# Patient Record
Sex: MALE | Race: WHITE | NOT HISPANIC OR LATINO
[De-identification: names, ages, dates, MRNs, and addresses within clinical notes are randomized per-mention and may not be internally consistent; named-entity substitution may affect disease eponyms.]

---

## 2021-04-13 ENCOUNTER — APPOINTMENT (OUTPATIENT)
Dept: PULMONOLOGY | Facility: CLINIC | Age: 46
End: 2021-04-13
Payer: MEDICAID

## 2021-04-13 VITALS — BODY MASS INDEX: 39.18 KG/M2 | WEIGHT: 315 LBS | SYSTOLIC BLOOD PRESSURE: 118 MMHG | DIASTOLIC BLOOD PRESSURE: 80 MMHG

## 2021-04-13 DIAGNOSIS — Z99.89 OBSTRUCTIVE SLEEP APNEA (ADULT) (PEDIATRIC): ICD-10-CM

## 2021-04-13 DIAGNOSIS — Z86.39 PERSONAL HISTORY OF OTHER ENDOCRINE, NUTRITIONAL AND METABOLIC DISEASE: ICD-10-CM

## 2021-04-13 DIAGNOSIS — I48.0 PAROXYSMAL ATRIAL FIBRILLATION: ICD-10-CM

## 2021-04-13 DIAGNOSIS — Z86.79 PERSONAL HISTORY OF OTHER DISEASES OF THE CIRCULATORY SYSTEM: ICD-10-CM

## 2021-04-13 DIAGNOSIS — G47.33 OBSTRUCTIVE SLEEP APNEA (ADULT) (PEDIATRIC): ICD-10-CM

## 2021-04-13 PROCEDURE — 99072 ADDL SUPL MATRL&STAF TM PHE: CPT

## 2021-04-13 PROCEDURE — 99204 OFFICE O/P NEW MOD 45 MIN: CPT

## 2021-04-18 DIAGNOSIS — Z01.818 ENCOUNTER FOR OTHER PREPROCEDURAL EXAMINATION: ICD-10-CM

## 2021-04-19 ENCOUNTER — APPOINTMENT (OUTPATIENT)
Dept: DISASTER EMERGENCY | Facility: CLINIC | Age: 46
End: 2021-04-19

## 2021-04-20 LAB — SARS-COV-2 N GENE NPH QL NAA+PROBE: NOT DETECTED

## 2021-04-21 ENCOUNTER — OUTPATIENT (OUTPATIENT)
Dept: OUTPATIENT SERVICES | Facility: HOSPITAL | Age: 46
LOS: 1 days | End: 2021-04-21
Payer: COMMERCIAL

## 2021-04-21 DIAGNOSIS — G47.33 OBSTRUCTIVE SLEEP APNEA (ADULT) (PEDIATRIC): ICD-10-CM

## 2021-04-21 PROCEDURE — 95811 POLYSOM 6/>YRS CPAP 4/> PARM: CPT

## 2021-04-21 PROCEDURE — 95811 POLYSOM 6/>YRS CPAP 4/> PARM: CPT | Mod: 26

## 2021-06-27 ENCOUNTER — TRANSCRIPTION ENCOUNTER (OUTPATIENT)
Age: 46
End: 2021-06-27

## 2021-07-08 ENCOUNTER — NON-APPOINTMENT (OUTPATIENT)
Age: 46
End: 2021-07-08

## 2021-07-08 ENCOUNTER — APPOINTMENT (OUTPATIENT)
Dept: ELECTROPHYSIOLOGY | Facility: CLINIC | Age: 46
End: 2021-07-08
Payer: MEDICAID

## 2021-07-08 VITALS
BODY MASS INDEX: 36.45 KG/M2 | SYSTOLIC BLOOD PRESSURE: 122 MMHG | TEMPERATURE: 97.2 F | OXYGEN SATURATION: 96 % | DIASTOLIC BLOOD PRESSURE: 74 MMHG | WEIGHT: 315 LBS | HEIGHT: 78 IN | HEART RATE: 75 BPM

## 2021-07-08 VITALS — DIASTOLIC BLOOD PRESSURE: 76 MMHG | SYSTOLIC BLOOD PRESSURE: 120 MMHG

## 2021-07-08 DIAGNOSIS — Z78.9 OTHER SPECIFIED HEALTH STATUS: ICD-10-CM

## 2021-07-08 DIAGNOSIS — Z60.2 PROBLEMS RELATED TO LIVING ALONE: ICD-10-CM

## 2021-07-08 DIAGNOSIS — Z63.5 DISRUPTION OF FAMILY BY SEPARATION AND DIVORCE: ICD-10-CM

## 2021-07-08 PROCEDURE — 99204 OFFICE O/P NEW MOD 45 MIN: CPT | Mod: 25

## 2021-07-08 PROCEDURE — 93000 ELECTROCARDIOGRAM COMPLETE: CPT

## 2021-07-08 RX ORDER — METFORMIN HYDROCHLORIDE 1000 MG/1
1000 TABLET, COATED ORAL DAILY
Refills: 0 | Status: ACTIVE | COMMUNITY

## 2021-07-08 RX ORDER — APIXABAN 5 MG/1
5 TABLET, FILM COATED ORAL
Qty: 60 | Refills: 3 | Status: ACTIVE | COMMUNITY

## 2021-07-08 SDOH — SOCIAL STABILITY - SOCIAL INSECURITY: DISRUPTION OF FAMILY BY SEPARATION AND DIVORCE: Z63.5

## 2021-07-08 SDOH — SOCIAL STABILITY - SOCIAL INSECURITY: PROBLEMS RELATED TO LIVING ALONE: Z60.2

## 2022-05-16 ENCOUNTER — TRANSCRIPTION ENCOUNTER (OUTPATIENT)
Age: 47
End: 2022-05-16

## 2022-05-16 ENCOUNTER — OUTPATIENT (OUTPATIENT)
Dept: OUTPATIENT SERVICES | Facility: HOSPITAL | Age: 47
LOS: 1 days | End: 2022-05-16
Payer: COMMERCIAL

## 2022-05-16 ENCOUNTER — OUTPATIENT (OUTPATIENT)
Dept: OUTPATIENT SERVICES | Facility: HOSPITAL | Age: 47
LOS: 1 days | End: 2022-05-16

## 2022-05-16 VITALS
RESPIRATION RATE: 18 BRPM | OXYGEN SATURATION: 97 % | HEART RATE: 69 BPM | SYSTOLIC BLOOD PRESSURE: 102 MMHG | TEMPERATURE: 97 F | WEIGHT: 315 LBS | DIASTOLIC BLOOD PRESSURE: 65 MMHG | HEIGHT: 78 IN

## 2022-05-16 DIAGNOSIS — U07.1 COVID-19: ICD-10-CM

## 2022-05-16 DIAGNOSIS — Z98.890 OTHER SPECIFIED POSTPROCEDURAL STATES: Chronic | ICD-10-CM

## 2022-05-16 DIAGNOSIS — I48.0 PAROXYSMAL ATRIAL FIBRILLATION: ICD-10-CM

## 2022-05-16 DIAGNOSIS — I10 ESSENTIAL (PRIMARY) HYPERTENSION: ICD-10-CM

## 2022-05-16 DIAGNOSIS — E11.9 TYPE 2 DIABETES MELLITUS WITHOUT COMPLICATIONS: ICD-10-CM

## 2022-05-16 DIAGNOSIS — Z01.818 ENCOUNTER FOR OTHER PREPROCEDURAL EXAMINATION: ICD-10-CM

## 2022-05-16 DIAGNOSIS — J34.2 DEVIATED NASAL SEPTUM: Chronic | ICD-10-CM

## 2022-05-16 LAB
ANION GAP SERPL CALC-SCNC: 14 MMOL/L — SIGNIFICANT CHANGE UP (ref 5–17)
APTT BLD: 33 SEC — SIGNIFICANT CHANGE UP (ref 27.5–35.5)
BASOPHILS # BLD AUTO: 0.03 K/UL — SIGNIFICANT CHANGE UP (ref 0–0.2)
BASOPHILS NFR BLD AUTO: 0.3 % — SIGNIFICANT CHANGE UP (ref 0–2)
BLD GP AB SCN SERPL QL: SIGNIFICANT CHANGE UP
BUN SERPL-MCNC: 13.2 MG/DL — SIGNIFICANT CHANGE UP (ref 8–20)
CALCIUM SERPL-MCNC: 8.8 MG/DL — SIGNIFICANT CHANGE UP (ref 8.6–10.2)
CHLORIDE SERPL-SCNC: 101 MMOL/L — SIGNIFICANT CHANGE UP (ref 98–107)
CO2 SERPL-SCNC: 22 MMOL/L — SIGNIFICANT CHANGE UP (ref 22–29)
CREAT SERPL-MCNC: 0.82 MG/DL — SIGNIFICANT CHANGE UP (ref 0.5–1.3)
EGFR: 110 ML/MIN/1.73M2 — SIGNIFICANT CHANGE UP
EOSINOPHIL # BLD AUTO: 0.68 K/UL — HIGH (ref 0–0.5)
EOSINOPHIL NFR BLD AUTO: 7 % — HIGH (ref 0–6)
GLUCOSE SERPL-MCNC: 150 MG/DL — HIGH (ref 70–99)
HCT VFR BLD CALC: 42.8 % — SIGNIFICANT CHANGE UP (ref 39–50)
HGB BLD-MCNC: 15.1 G/DL — SIGNIFICANT CHANGE UP (ref 13–17)
IMM GRANULOCYTES NFR BLD AUTO: 0.6 % — SIGNIFICANT CHANGE UP (ref 0–1.5)
INR BLD: 1.3 RATIO — HIGH (ref 0.88–1.16)
LYMPHOCYTES # BLD AUTO: 1.46 K/UL — SIGNIFICANT CHANGE UP (ref 1–3.3)
LYMPHOCYTES # BLD AUTO: 14.9 % — SIGNIFICANT CHANGE UP (ref 13–44)
MAGNESIUM SERPL-MCNC: 1.7 MG/DL — SIGNIFICANT CHANGE UP (ref 1.6–2.6)
MCHC RBC-ENTMCNC: 30.1 PG — SIGNIFICANT CHANGE UP (ref 27–34)
MCHC RBC-ENTMCNC: 35.3 GM/DL — SIGNIFICANT CHANGE UP (ref 32–36)
MCV RBC AUTO: 85.3 FL — SIGNIFICANT CHANGE UP (ref 80–100)
MONOCYTES # BLD AUTO: 0.81 K/UL — SIGNIFICANT CHANGE UP (ref 0–0.9)
MONOCYTES NFR BLD AUTO: 8.3 % — SIGNIFICANT CHANGE UP (ref 2–14)
NEUTROPHILS # BLD AUTO: 6.74 K/UL — SIGNIFICANT CHANGE UP (ref 1.8–7.4)
NEUTROPHILS NFR BLD AUTO: 68.9 % — SIGNIFICANT CHANGE UP (ref 43–77)
PLATELET # BLD AUTO: 247 K/UL — SIGNIFICANT CHANGE UP (ref 150–400)
POTASSIUM SERPL-MCNC: 4.2 MMOL/L — SIGNIFICANT CHANGE UP (ref 3.5–5.3)
POTASSIUM SERPL-SCNC: 4.2 MMOL/L — SIGNIFICANT CHANGE UP (ref 3.5–5.3)
PROTHROM AB SERPL-ACNC: 15.1 SEC — HIGH (ref 10.5–13.4)
RBC # BLD: 5.02 M/UL — SIGNIFICANT CHANGE UP (ref 4.2–5.8)
RBC # FLD: 12 % — SIGNIFICANT CHANGE UP (ref 10.3–14.5)
SODIUM SERPL-SCNC: 137 MMOL/L — SIGNIFICANT CHANGE UP (ref 135–145)
WBC # BLD: 9.78 K/UL — SIGNIFICANT CHANGE UP (ref 3.8–10.5)
WBC # FLD AUTO: 9.78 K/UL — SIGNIFICANT CHANGE UP (ref 3.8–10.5)

## 2022-05-16 PROCEDURE — 93005 ELECTROCARDIOGRAM TRACING: CPT

## 2022-05-16 PROCEDURE — G0463: CPT

## 2022-05-16 PROCEDURE — 93010 ELECTROCARDIOGRAM REPORT: CPT

## 2022-05-16 NOTE — H&P PST ADULT - CARDIOVASCULAR
not applicable details… Regular rate & rhythm, normal S1, S2; no murmurs, gallops or rubs; no S3, S4

## 2022-05-16 NOTE — H&P PST ADULT - HISTORY OF PRESENT ILLNESS
46y Male with history of _____________, and symptomatic **paroxysmal or persistent** atrial fibrillation. **add additional description as applicable**     Male now presents in anticipation of elective radiofrequency ablation of atrial fibrillation.       Echocardiogram (date):   Stress Test (date):  Cardiac CT or MRI (date):   Cardiac Cath (date):   Cardiac surgery (date):  46y Male with history of htn, DM, obesity  sleep apnea uses CPAP  and symptomatic paroxysmal atrial fibrillation. Pt states he started a few yrs ago ( approx 3 ) and would feel heart racing with dyspnea. Pt treats  with metoprolol and eliquis, Pt states episodes have become more frequent about every week  and would like to have ablation.      Male now presents in anticipation of elective radiofrequency ablation of atrial fibrillation.       Echocardiogram (date): 4/6/21 [facility]  4/6/21  LVEF 50-55 %   Stress Test (date):  Cardiac CT or MRI (date): 2022 [facility] no CAD  4/28/21  calcium score 95   Cardiac Cath (date): 2021 [facility] no cad   Cardiac surgery (date):

## 2022-05-16 NOTE — H&P PST ADULT - NSICDXFAMILYHX_GEN_ALL_CORE_FT
FAMILY HISTORY:  Mother  Still living? No  Family history of atrial fibrillation, Age at diagnosis: Age Unknown     FAMILY HISTORY:  Father  Still living? No  FH: GI cancer, Age at diagnosis: Age Unknown    Mother  Still living? No  Family history of atrial fibrillation, Age at diagnosis: Age Unknown

## 2022-05-16 NOTE — H&P PST ADULT - NSICDXPASTMEDICALHX_GEN_ALL_CORE_FT
PAST MEDICAL HISTORY:  HTN (hypertension)     Obesity     Paroxysmal atrial fibrillation     Sleep apnea      PAST MEDICAL HISTORY:  Diabetes mellitus     Fatty liver     HTN (hypertension)     Obesity     Paroxysmal atrial fibrillation     Sleep apnea

## 2022-05-16 NOTE — H&P PST ADULT - ASSESSMENT
Plan:   Labs pending.   Pre-procedure instructions provided (verbal & written) as follows:  Ablation */*/2021   - Last dose Eliquis/Pradaxa/Xarelto/Savaysa */*/2021 PM (NO a/c day of ablation).   OR - Continue warfarin without interruption.   - NPO after midnight prior except meds w/ sips of water.    - Hold the following medications the morning of the procedure: ***Check MD note for possible instructions re: holding antiarrhythmic medications*** 46y Male with history of htn, DM, obesity  sleep apnea uses CPAP  and symptomatic paroxysmal atrial fibrillation. Pt states he started a few yrs ago ( approx 3 ) and would feel heart racing with dyspnea. Pt treats  with metoprolol and eliquis, Pt states episodes have become more frequent about every week  and would like to have ablation.      Male now presents in anticipation of elective radiofrequency ablation of atrial fibrillation.       Echocardiogram (date): 21 [facility]  21  LVEF 50-55 %   Stress Test (date):  Cardiac CT or MRI (date):  [facility] no CAD  21  calcium score 95   Cardiac Cath (date):  [facility] no cad   Cardiac surgery (date):   Plan: afib ablation   Labs pending.   Pre-procedure instructions provided (verbal & written) as follows:  Ablation 22    - Last dose Eliquis 22 PM (NO a/c day of ablation).      - NPO after midnight prior except meds w/ sips of water. metoporol   hold metformin am of procedure   OPIOID RISK TOOL    FALLON EACH BOX THAT APPLIES AND ADD TOTALS AT THE END    FAMILY HISTORY OF SUBSTANCE ABUSE                 FEMALE         MALE                                                Alcohol                             [  ]1 pt          [  ]3pts                                               Illegal Durgs                     [  ]2 pts        [  ]3pts                                               Rx Drugs                           [  ]4 pts        [  ]4 pts    PERSONAL HISTORY OF SUBSTANCE ABUSE                                                                                          Alcohol                             [  ]3 pts       [  ]3 pts                                               Illegal Durgs                     [  ]4 pts        [  ]4 pts                                               Rx Drugs                           [  ]5 pts        [  ]5 pts    AGE BETWEEN 16-45 YEARS                                      [  ]1 pt         [  ]1 pt    HISTORY OF PREADOLESCENT   SEXUAL ABUSE                                                             [  ]3 pts        [  ]0pts    PSYCHOLOGICAL DISEASE                     ADD, OCD, Bipolar, Schizophrenia        [  ]2 pts         [  ]2 pts                      Depression                                               [  ]1 pt           [  ]1 pt           SCORING TOTAL   (add numbers and type here)              (*0**)                                     A score of 3 or lower indicated LOW risk for future opiod abuse  A score of 4 to 7 indicated moderate risk for future opiod abuse  A score of 8 or higher indicates a high risk for opiod abuse  CAPRINI VTE 2.0 SCORE [CLOT updated 2019]    AGE RELATED RISK FACTORS                                                       MOBILITY RELATED FACTORS  [X ] Age 41-60 years                                            (1 Point)                    [ ] Bed rest                                                        (1 Point)  [ ] Age: 61-74 years                                           (2 Points)                  [ ] Plaster cast                                                   (2 Points)  [ ] Age= 75 years                                              (3 Points)                    [ ] Bed bound for more than 72 hours                 (2 Points)    DISEASE RELATED RISK FACTORS                                               GENDER SPECIFIC FACTORS  [ ] Edema in the lower extremities                       (1 Point)              [ ] Pregnancy                                                     (1 Point)  [ ] Varicose veins                                               (1 Point)                     [ ] Post-partum < 6 weeks                                   (1 Point)             [ X] BMI > 25 Kg/m2                                            (1 Point)                     [ ] Hormonal therapy  or oral contraception          (1 Point)                 [ ] Sepsis (in the previous month)                        (1 Point)               [ ] History of pregnancy complications                 (1 point)  [ ] Pneumonia or serious lung disease                                               [ ] Unexplained or recurrent                     (1 Point)           (in the previous month)                               (1 Point)  [ ] Abnormal pulmonary function test                     (1 Point)                 SURGERY RELATED RISK FACTORS  [ ] Acute myocardial infarction                              (1 Point)               [ ]  Section                                             (1 Point)  [ ] Congestive heart failure (in the previous month)  (1 Point)      [ ] Minor surgery                                                  (1 Point)   [ ] Inflammatory bowel disease                             (1 Point)               [ ] Arthroscopic surgery                                        (2 Points)  [ ] Central venous access                                      (2 Points)                [x ] General surgery lasting more than 45 minutes (2 points)  [ ] Malignancy- Present or previous                   (2 Points)                [ ] Elective arthroplasty                                         (5 points)    [ ] Stroke (in the previous month)                          (5 Points)                                                                                                                                                           HEMATOLOGY RELATED FACTORS                                                 TRAUMA RELATED RISK FACTORS  [ ] Prior episodes of VTE                                     (3 Points)                [ ] Fracture of the hip, pelvis, or leg                       (5 Points)  [ ] Positive family history for VTE                         (3 Points)             [ ] Acute spinal cord injury (in the previous month)  (5 Points)  [ ] Prothrombin 32524 A                                     (3 Points)               [ ] Paralysis  (less than 1 month)                             (5 Points)  [ ] Factor V Leiden                                             (3 Points)                  [ ] Multiple Trauma within 1 month                        (5 Points)  [ ] Lupus anticoagulants                                     (3 Points)                                                           [ ] Anticardiolipin antibodies                               (3 Points)                                                       [ ] High homocysteine in the blood                      (3 Points)                                             [ ] Other congenital or acquired thrombophilia      (3 Points)                                                [ ] Heparin induced thrombocytopenia                  (3 Points)                                     Total Score [     4     ]

## 2022-05-23 ENCOUNTER — INPATIENT (INPATIENT)
Facility: HOSPITAL | Age: 47
LOS: 0 days | Discharge: HOME | DRG: 274 | End: 2022-05-24
Attending: STUDENT IN AN ORGANIZED HEALTH CARE EDUCATION/TRAINING PROGRAM | Admitting: STUDENT IN AN ORGANIZED HEALTH CARE EDUCATION/TRAINING PROGRAM
Payer: COMMERCIAL

## 2022-05-23 ENCOUNTER — TRANSCRIPTION ENCOUNTER (OUTPATIENT)
Age: 47
End: 2022-05-23

## 2022-05-23 VITALS
SYSTOLIC BLOOD PRESSURE: 159 MMHG | OXYGEN SATURATION: 97 % | DIASTOLIC BLOOD PRESSURE: 86 MMHG | RESPIRATION RATE: 20 BRPM | TEMPERATURE: 98 F | HEART RATE: 60 BPM

## 2022-05-23 DIAGNOSIS — Z98.890 OTHER SPECIFIED POSTPROCEDURAL STATES: Chronic | ICD-10-CM

## 2022-05-23 DIAGNOSIS — J34.2 DEVIATED NASAL SEPTUM: Chronic | ICD-10-CM

## 2022-05-23 DIAGNOSIS — I48.0 PAROXYSMAL ATRIAL FIBRILLATION: ICD-10-CM

## 2022-05-23 LAB
ABO RH CONFIRMATION: SIGNIFICANT CHANGE UP
GLUCOSE BLDC GLUCOMTR-MCNC: 179 MG/DL — HIGH (ref 70–99)
GLUCOSE BLDC GLUCOMTR-MCNC: 189 MG/DL — HIGH (ref 70–99)
GLUCOSE BLDC GLUCOMTR-MCNC: 198 MG/DL — HIGH (ref 70–99)
GLUCOSE BLDC GLUCOMTR-MCNC: 235 MG/DL — HIGH (ref 70–99)
SARS-COV-2 N GENE NPH QL NAA+PROBE: NOT DETECTED

## 2022-05-23 PROCEDURE — 93623 PRGRMD STIMJ&PACG IV RX NFS: CPT | Mod: 26

## 2022-05-23 PROCEDURE — 93656 COMPRE EP EVAL ABLTJ ATR FIB: CPT

## 2022-05-23 PROCEDURE — 93657 TX L/R ATRIAL FIB ADDL: CPT

## 2022-05-23 RX ORDER — LOSARTAN POTASSIUM 100 MG/1
50 TABLET, FILM COATED ORAL DAILY
Refills: 0 | Status: DISCONTINUED | OUTPATIENT
Start: 2022-05-23 | End: 2022-05-24

## 2022-05-23 RX ORDER — SODIUM CHLORIDE 9 MG/ML
1000 INJECTION, SOLUTION INTRAVENOUS
Refills: 0 | Status: DISCONTINUED | OUTPATIENT
Start: 2022-05-23 | End: 2022-05-24

## 2022-05-23 RX ORDER — METFORMIN HYDROCHLORIDE 850 MG/1
1 TABLET ORAL
Qty: 0 | Refills: 0 | DISCHARGE

## 2022-05-23 RX ORDER — ATORVASTATIN CALCIUM 80 MG/1
20 TABLET, FILM COATED ORAL AT BEDTIME
Refills: 0 | Status: DISCONTINUED | OUTPATIENT
Start: 2022-05-23 | End: 2022-05-24

## 2022-05-23 RX ORDER — ALPRAZOLAM 0.25 MG
0.25 TABLET ORAL EVERY 6 HOURS
Refills: 0 | Status: DISCONTINUED | OUTPATIENT
Start: 2022-05-23 | End: 2022-05-24

## 2022-05-23 RX ORDER — ATORVASTATIN CALCIUM 80 MG/1
1 TABLET, FILM COATED ORAL
Qty: 0 | Refills: 0 | DISCHARGE

## 2022-05-23 RX ORDER — DEXTROSE 50 % IN WATER 50 %
12.5 SYRINGE (ML) INTRAVENOUS ONCE
Refills: 0 | Status: DISCONTINUED | OUTPATIENT
Start: 2022-05-23 | End: 2022-05-24

## 2022-05-23 RX ORDER — APIXABAN 2.5 MG/1
1 TABLET, FILM COATED ORAL
Qty: 0 | Refills: 0 | DISCHARGE

## 2022-05-23 RX ORDER — SEMAGLUTIDE 0.68 MG/ML
0 INJECTION, SOLUTION SUBCUTANEOUS
Qty: 0 | Refills: 0 | DISCHARGE

## 2022-05-23 RX ORDER — DEXTROSE 50 % IN WATER 50 %
15 SYRINGE (ML) INTRAVENOUS ONCE
Refills: 0 | Status: DISCONTINUED | OUTPATIENT
Start: 2022-05-23 | End: 2022-05-24

## 2022-05-23 RX ORDER — DEXTROSE 50 % IN WATER 50 %
25 SYRINGE (ML) INTRAVENOUS ONCE
Refills: 0 | Status: DISCONTINUED | OUTPATIENT
Start: 2022-05-23 | End: 2022-05-24

## 2022-05-23 RX ORDER — PANTOPRAZOLE SODIUM 20 MG/1
40 TABLET, DELAYED RELEASE ORAL EVERY 12 HOURS
Refills: 0 | Status: DISCONTINUED | OUTPATIENT
Start: 2022-05-23 | End: 2022-05-24

## 2022-05-23 RX ORDER — LOSARTAN POTASSIUM 100 MG/1
1 TABLET, FILM COATED ORAL
Qty: 0 | Refills: 0 | DISCHARGE

## 2022-05-23 RX ORDER — METOPROLOL TARTRATE 50 MG
50 TABLET ORAL DAILY
Refills: 0 | Status: DISCONTINUED | OUTPATIENT
Start: 2022-05-23 | End: 2022-05-24

## 2022-05-23 RX ORDER — ACETAMINOPHEN 500 MG
650 TABLET ORAL EVERY 6 HOURS
Refills: 0 | Status: DISCONTINUED | OUTPATIENT
Start: 2022-05-23 | End: 2022-05-24

## 2022-05-23 RX ORDER — APIXABAN 2.5 MG/1
5 TABLET, FILM COATED ORAL
Refills: 0 | Status: DISCONTINUED | OUTPATIENT
Start: 2022-05-23 | End: 2022-05-24

## 2022-05-23 RX ORDER — GLUCAGON INJECTION, SOLUTION 0.5 MG/.1ML
1 INJECTION, SOLUTION SUBCUTANEOUS ONCE
Refills: 0 | Status: DISCONTINUED | OUTPATIENT
Start: 2022-05-23 | End: 2022-05-24

## 2022-05-23 RX ORDER — INSULIN LISPRO 100/ML
VIAL (ML) SUBCUTANEOUS AT BEDTIME
Refills: 0 | Status: DISCONTINUED | OUTPATIENT
Start: 2022-05-23 | End: 2022-05-24

## 2022-05-23 RX ORDER — METOPROLOL TARTRATE 50 MG
1 TABLET ORAL
Qty: 0 | Refills: 0 | DISCHARGE

## 2022-05-23 RX ORDER — SUCRALFATE 1 G
1 TABLET ORAL EVERY 12 HOURS
Refills: 0 | Status: DISCONTINUED | OUTPATIENT
Start: 2022-05-23 | End: 2022-05-24

## 2022-05-23 RX ORDER — OXYCODONE HYDROCHLORIDE 5 MG/1
5 TABLET ORAL EVERY 6 HOURS
Refills: 0 | Status: DISCONTINUED | OUTPATIENT
Start: 2022-05-23 | End: 2022-05-24

## 2022-05-23 RX ORDER — LISINOPRIL 2.5 MG/1
1 TABLET ORAL
Qty: 0 | Refills: 0 | DISCHARGE

## 2022-05-23 RX ORDER — BENZOCAINE AND MENTHOL 5; 1 G/100ML; G/100ML
1 LIQUID ORAL
Refills: 0 | Status: DISCONTINUED | OUTPATIENT
Start: 2022-05-23 | End: 2022-05-24

## 2022-05-23 RX ORDER — INSULIN LISPRO 100/ML
VIAL (ML) SUBCUTANEOUS
Refills: 0 | Status: DISCONTINUED | OUTPATIENT
Start: 2022-05-23 | End: 2022-05-24

## 2022-05-23 RX ADMIN — PANTOPRAZOLE SODIUM 40 MILLIGRAM(S): 20 TABLET, DELAYED RELEASE ORAL at 17:48

## 2022-05-23 RX ADMIN — Medication 2: at 16:08

## 2022-05-23 RX ADMIN — ATORVASTATIN CALCIUM 20 MILLIGRAM(S): 80 TABLET, FILM COATED ORAL at 21:55

## 2022-05-23 RX ADMIN — Medication 50 MILLIGRAM(S): at 21:55

## 2022-05-23 RX ADMIN — Medication 0.25 MILLIGRAM(S): at 21:55

## 2022-05-23 RX ADMIN — Medication 1 GRAM(S): at 17:46

## 2022-05-23 RX ADMIN — APIXABAN 5 MILLIGRAM(S): 2.5 TABLET, FILM COATED ORAL at 16:07

## 2022-05-23 NOTE — PROGRESS NOTE ADULT - SUBJECTIVE AND OBJECTIVE BOX
PROCEDURE(S): Radiofrequency Ablation of Atrial Fibrillation    ELECTROPHYSIOLOGIST(S): Chin Egan MD         COMPLICATIONS:  none        DISPOSITION: observation      CONDITION: stable      Pt doing well s/p elective radiofrequency atrial fibrillation ablation (WACA/PVI, CTI line) access via b/l femoral veins, hemostasis achieved with figure of 8 sutures b/l.  Pt denies any complaint post procedure.     MEDICATIONS  (STANDING):  apixaban 5 milliGRAM(s) Oral <User Schedule>  atorvastatin 20 milliGRAM(s) Oral at bedtime  dextrose 5%. 1000 milliLiter(s) (50 mL/Hr) IV Continuous <Continuous>  dextrose 5%. 1000 milliLiter(s) (100 mL/Hr) IV Continuous <Continuous>  dextrose 50% Injectable 25 Gram(s) IV Push once  dextrose 50% Injectable 12.5 Gram(s) IV Push once  dextrose 50% Injectable 25 Gram(s) IV Push once  glucagon  Injectable 1 milliGRAM(s) IntraMuscular once  insulin lispro (ADMELOG) corrective regimen sliding scale   SubCutaneous three times a day before meals  insulin lispro (ADMELOG) corrective regimen sliding scale   SubCutaneous at bedtime  losartan 50 milliGRAM(s) Oral daily  metoprolol succinate ER 50 milliGRAM(s) Oral daily  pantoprazole    Tablet 40 milliGRAM(s) Oral every 12 hours  sucralfate suspension 1 Gram(s) Oral every 12 hours    MEDICATIONS  (PRN):  acetaminophen     Tablet .. 650 milliGRAM(s) Oral every 6 hours PRN Moderate Pain (4 - 6)  aluminum hydroxide/magnesium hydroxide/simethicone Suspension 30 milliLiter(s) Oral every 4 hours PRN Dyspepsia  benzocaine 15 mG/menthol 3.6 mG Lozenge 1 Lozenge Oral every 2 hours PRN Sore Throat  dextrose Oral Gel 15 Gram(s) Oral once PRN Blood Glucose LESS THAN 70 milliGRAM(s)/deciliter  oxyCODONE    IR 5 milliGRAM(s) Oral every 6 hours PRN Severe Pain (7 - 10)      Allergies    No Known Allergies    VS:  T(C): 36.8 (05-23-22 @ 07:44), Max: 36.8 (05-23-22 @ 07:44)  HR: 60 (05-23-22 @ 07:44) (60 - 60)  BP: 159/86 (05-23-22 @ 07:44) (159/86 - 159/86)  RR: 20 (05-23-22 @ 07:44) (20 - 20)  SpO2: 97% (05-23-22 @ 07:44) (97% - 97%)      Post procedure VS:  HR: 83  BP: 131/71  RR: 18  SpO2: 93%      Exam:  General: NAD, A&O x 3  Card: S1/S2, RRR, no m/g/r  Resp: lungs CTA b/l  Abd: S/NT/ND  Groins: hemostatic sutures in place b/l; sites C/D/I; no bleeding, hematoma, erythema, exudate or edema  Ext: no edema; distal pulses intact    I/O's    Input: 4507    ECG: Pending    Assessment:   46y Male h/o HTN, DM II, HLD, FIFI (nocturnal CPAP), paroxsymal atrial fibrillation (Eliquis), with symptomatic palpitations associated with dyspnea, now status post elective radiofrequency atrial fibrillation ablation (WACA/PVI, CTI line) access via b/l femoral veins, hemostasis achieved with figure of 8 sutures b/l.  Pt denies any complaint post procedure.       Plan:   Bedrest x 4 hours, then OOB with assistance and progress as tolerated.   Groin sutures to be removed by EP service in AM.   Pending groin status: Resume Eliquis 5mg BID @ 1600  Start Protonix 40mg BID x 2 weeks then daily X 6 weeks.     Start Carafate 1gm BID x 2 weeks.   Decrease Metoprolol Succinate to 50mg daily.    Continue other home medications.   Strict I/Os.  Please encourage incentive spirometry and ambulation once able.  Observation and monitoring on telemetry overnight with anticipated discharge in the AM and outpt follow up in 1 month.   PROCEDURE(S): Radiofrequency Ablation of Atrial Fibrillation    ELECTROPHYSIOLOGIST(S): Chin Egan MD         COMPLICATIONS:  none        DISPOSITION: observation      CONDITION: stable      Pt doing well s/p elective radiofrequency atrial fibrillation ablation (WACA/PVI, CTI line) access via b/l femoral veins, hemostasis achieved with figure of 8 sutures b/l.  Pt denies any complaint post procedure.     MEDICATIONS  (STANDING):  apixaban 5 milliGRAM(s) Oral <User Schedule>  atorvastatin 20 milliGRAM(s) Oral at bedtime  dextrose 5%. 1000 milliLiter(s) (50 mL/Hr) IV Continuous <Continuous>  dextrose 5%. 1000 milliLiter(s) (100 mL/Hr) IV Continuous <Continuous>  dextrose 50% Injectable 25 Gram(s) IV Push once  dextrose 50% Injectable 12.5 Gram(s) IV Push once  dextrose 50% Injectable 25 Gram(s) IV Push once  glucagon  Injectable 1 milliGRAM(s) IntraMuscular once  insulin lispro (ADMELOG) corrective regimen sliding scale   SubCutaneous three times a day before meals  insulin lispro (ADMELOG) corrective regimen sliding scale   SubCutaneous at bedtime  losartan 50 milliGRAM(s) Oral daily  metoprolol succinate ER 50 milliGRAM(s) Oral daily  pantoprazole    Tablet 40 milliGRAM(s) Oral every 12 hours  sucralfate suspension 1 Gram(s) Oral every 12 hours    MEDICATIONS  (PRN):  acetaminophen     Tablet .. 650 milliGRAM(s) Oral every 6 hours PRN Moderate Pain (4 - 6)  aluminum hydroxide/magnesium hydroxide/simethicone Suspension 30 milliLiter(s) Oral every 4 hours PRN Dyspepsia  benzocaine 15 mG/menthol 3.6 mG Lozenge 1 Lozenge Oral every 2 hours PRN Sore Throat  dextrose Oral Gel 15 Gram(s) Oral once PRN Blood Glucose LESS THAN 70 milliGRAM(s)/deciliter  oxyCODONE    IR 5 milliGRAM(s) Oral every 6 hours PRN Severe Pain (7 - 10)      Allergies    No Known Allergies    VS:  T(C): 36.8 (05-23-22 @ 07:44), Max: 36.8 (05-23-22 @ 07:44)  HR: 60 (05-23-22 @ 07:44) (60 - 60)  BP: 159/86 (05-23-22 @ 07:44) (159/86 - 159/86)  RR: 20 (05-23-22 @ 07:44) (20 - 20)  SpO2: 97% (05-23-22 @ 07:44) (97% - 97%)      Post procedure VS:  HR: 83  BP: 131/71  RR: 18  SpO2: 93%      Exam:  General: NAD, A&O x 3  Card: S1/S2, RRR, no m/g/r  Resp: lungs CTA b/l  Abd: S/NT/ND  Groins: hemostatic sutures in place b/l; sites C/D/I; no bleeding, hematoma, erythema, exudate or edema  Ext: no edema; distal pulses intact    I/O's    Input: 2074    ECG: Pending    Assessment:   46y Male h/o HTN, DM II, HLD, FIFI (nocturnal CPAP), paroxsymal atrial fibrillation (Eliquis), with symptomatic palpitations associated with dyspnea, now status post elective radiofrequency atrial fibrillation ablation (WACA/PVI, CTI line) access via b/l femoral veins, hemostasis achieved with figure of 8 sutures b/l. Intraoperative VERA deferred as pt reported strict compliance with AC, MIRANDA cleared intraoperatively with ICE catheter.  Pt denies any complaint post procedure.       Plan:   Bedrest x 4 hours, then OOB with assistance and progress as tolerated.   Groin sutures to be removed by EP service in AM.   Pending groin status: Resume Eliquis 5mg BID @ 1600  Start Protonix 40mg BID x 2 weeks then daily X 6 weeks.     Start Carafate 1gm BID x 2 weeks.   Decrease Metoprolol Succinate to 50mg daily.    Continue other home medications.   Strict I/Os.  Please encourage incentive spirometry and ambulation once able.  Observation and monitoring on telemetry overnight with anticipated discharge in the AM and outpt follow up in 1 month.   PROCEDURE(S): Radiofrequency Ablation of Atrial Fibrillation    ELECTROPHYSIOLOGIST(S): Chin Egan MD         COMPLICATIONS:  none        DISPOSITION: observation      CONDITION: stable      Pt doing well s/p elective radiofrequency atrial fibrillation ablation (WACA/PVI, CTI line) access via b/l femoral veins, hemostasis achieved with figure of 8 sutures b/l.  Pt denies any complaint post procedure.     MEDICATIONS  (STANDING):  apixaban 5 milliGRAM(s) Oral <User Schedule>  atorvastatin 20 milliGRAM(s) Oral at bedtime  dextrose 5%. 1000 milliLiter(s) (50 mL/Hr) IV Continuous <Continuous>  dextrose 5%. 1000 milliLiter(s) (100 mL/Hr) IV Continuous <Continuous>  dextrose 50% Injectable 25 Gram(s) IV Push once  dextrose 50% Injectable 12.5 Gram(s) IV Push once  dextrose 50% Injectable 25 Gram(s) IV Push once  glucagon  Injectable 1 milliGRAM(s) IntraMuscular once  insulin lispro (ADMELOG) corrective regimen sliding scale   SubCutaneous three times a day before meals  insulin lispro (ADMELOG) corrective regimen sliding scale   SubCutaneous at bedtime  losartan 50 milliGRAM(s) Oral daily  metoprolol succinate ER 50 milliGRAM(s) Oral daily  pantoprazole    Tablet 40 milliGRAM(s) Oral every 12 hours  sucralfate suspension 1 Gram(s) Oral every 12 hours    MEDICATIONS  (PRN):  acetaminophen     Tablet .. 650 milliGRAM(s) Oral every 6 hours PRN Moderate Pain (4 - 6)  aluminum hydroxide/magnesium hydroxide/simethicone Suspension 30 milliLiter(s) Oral every 4 hours PRN Dyspepsia  benzocaine 15 mG/menthol 3.6 mG Lozenge 1 Lozenge Oral every 2 hours PRN Sore Throat  dextrose Oral Gel 15 Gram(s) Oral once PRN Blood Glucose LESS THAN 70 milliGRAM(s)/deciliter  oxyCODONE    IR 5 milliGRAM(s) Oral every 6 hours PRN Severe Pain (7 - 10)      Allergies    No Known Allergies    VS:  T(C): 36.8 (05-23-22 @ 07:44), Max: 36.8 (05-23-22 @ 07:44)  HR: 60 (05-23-22 @ 07:44) (60 - 60)  BP: 159/86 (05-23-22 @ 07:44) (159/86 - 159/86)  RR: 20 (05-23-22 @ 07:44) (20 - 20)  SpO2: 97% (05-23-22 @ 07:44) (97% - 97%)      Post procedure VS:  HR: 83  BP: 131/71  RR: 18  SpO2: 93%      Exam:  General: NAD, A&O x 3  Card: S1/S2, RRR, no m/g/r  Resp: lungs CTA b/l  Abd: S/NT/ND  Groins: hemostatic sutures in place b/l; sites C/D/I; no bleeding, hematoma, erythema, exudate or edema  Ext: no edema; distal pulses intact    I/O's    Input: 2374    ECG: Sinus rhythm with intact conduction. Ventricular rate 70 BPM.     Assessment:   46y Male h/o HTN, DM II, HLD, FIFI (nocturnal CPAP), paroxsymal atrial fibrillation (Eliquis), with symptomatic palpitations associated with dyspnea, now status post elective radiofrequency atrial fibrillation ablation (WACA/PVI, CTI line) access via b/l femoral veins, hemostasis achieved with figure of 8 sutures b/l. Intraoperative VERA deferred as pt reported strict compliance with AC, MIRANDA cleared intraoperatively with ICE catheter.  Pt denies any complaint post procedure.       Plan:   Bedrest x 4 hours, then OOB with assistance and progress as tolerated.   Groin sutures to be removed by EP service in AM.   Pending groin status: Resume Eliquis 5mg BID @ 1600  Start Protonix 40mg BID x 2 weeks then daily X 6 weeks.     Start Carafate 1gm BID x 2 weeks.   Decrease Metoprolol Succinate to 50mg daily.    Continue other home medications.   Strict I/Os.  Please encourage incentive spirometry and ambulation once able.  Observation and monitoring on telemetry overnight with anticipated discharge in the AM and outpt follow up in 1 month.

## 2022-05-23 NOTE — DISCHARGE NOTE PROVIDER - CARE PROVIDER_API CALL
Chin Egan)  Cardiology; Internal Medicine  [address] [facility] [address] [facility] [address], [address]  [place] [address]  Phone: [phone]  Fax: [phone]  Follow Up Time:     Faustino Del Castillo)  Cardiology; Nuclear Cardiology  [address] [place] [address]  Phone: [phone]  Fax: [phone]  Follow Up Time:

## 2022-05-23 NOTE — DISCHARGE NOTE PROVIDER - NSDCFUADDINST_GEN_ALL_CORE_FT
Follow up with Dr. Egan in 2- 4 weeks. Our office will contact you in 3-5 days to schedule this appointment. Please call [phone] with questions or concerns.

## 2022-05-23 NOTE — DISCHARGE NOTE PROVIDER - NSDCMRMEDTOKEN_GEN_ALL_CORE_FT
atorvastatin 20 mg oral tablet: 1 tab(s) orally once a day  Eliquis 5 mg oral tablet: 1 tab(s) orally 2 times a day  losartan 50 mg oral tablet: 1 tab(s) orally once a day  metFORMIN 500 mg oral tablet: 1 tab(s) orally 2 times a day  Metoprolol Succinate ER 50 mg oral tablet, extended release: 1 tab(s) orally once a day   atorvastatin 20 mg oral tablet: 1 tab(s) orally once a day  Eliquis 5 mg oral tablet: 1 tab(s) orally 2 times a day  losartan 50 mg oral tablet: 1 tab(s) orally once a day  metFORMIN 500 mg oral tablet: 1 tab(s) orally 2 times a day  Metoprolol Succinate ER 50 mg oral tablet, extended release: 1 tab(s) orally once a day  pantoprazole 40 mg oral delayed release tablet: 1 tab(s) orally every 12 hours x 14 days then once a day for 6 weeks  sucralfate 1 g/10 mL oral suspension: 10 milliliter(s) orally every 12 hours x 14 days

## 2022-05-23 NOTE — DISCHARGE NOTE PROVIDER - HOSPITAL COURSE
46y Male h/o HTN, DM II, HLD, FIFI (nocturnal CPAP), paroxsymal atrial fibrillation (Eliquis), with symptomatic palpitations associated with dyspnea, now status post elective radiofrequency atrial fibrillation ablation (WACA/PVI, CTI line) access via b/l femoral veins, hemostasis achieved with figure of 8 sutures b/l.  Pt denies any complaint post procedure. 46y Male h/o HTN, DM II, HLD, FIFI (nocturnal CPAP), paroxsymal atrial fibrillation (Eliquis), with symptomatic palpitations associated with dyspnea, now status post elective radiofrequency atrial fibrillation ablation (WACA/PVI, CTI line) access via b/l femoral veins, hemostasis achieved with figure of 8 sutures b/l.  Pt denies any complaint post procedure. The patient was observed overnight without event and was discharged home the following morning with a plan for outpatient follow up.

## 2022-05-23 NOTE — PROGRESS NOTE ADULT - SUBJECTIVE AND OBJECTIVE BOX
45 y/o m, PMHx of HTN, DM II, HLD, FIFI (nocturnal CPAP), paroxsymal atrial fibrillation (Eliquis), presents today to [facility] for elective VERA / Afib ablation with Dr. Egan. Pt with known hx of PAF for the past 3-4 years for which he has been treated with metoprolol and Eliquis. Pt reports intermittent palpitations and dyspnea which can last up to an hour in duration which has become more frequent. Given pts progressive symptoms, despite rate control with beta blockers, he has elected to proceed with AF ablation today. Pt reports last PO intake as well as last Eliquis dose was yesterday evening. COVID-19 PCR negative 5/20/22. Pt reports no complaints at time of arrival today. Will continue to monitor in CCL pending procedure.       Cardiology Summary:  Echocardiogram (date): 4/6/21 st dayna  4/6/21  LVEF 50-55 %   Cardiac CT or MRI (date): 2022 [facility] no CAD  4/28/21  calcium score 95   Cardiac Cath (date): 2021 [facility] no cad  47 y/o m, PMHx of HTN, DM II, HLD, FIFI (nocturnal CPAP), paroxsymal atrial fibrillation (Eliquis), presents today to [facility] for elective Afib ablation with Dr. Egan. Pt with known hx of PAF for the past 3-4 years for which he has been treated with metoprolol and Eliquis. Pt reports intermittent palpitations and dyspnea which can last up to an hour in duration which has become more frequent. Given pts progressive symptoms, despite rate control with beta blockers, he has elected to proceed with AF ablation today. Pt reports last PO intake as well as last Eliquis dose was yesterday evening. COVID-19 PCR negative 5/20/22. Pt reports no complaints at time of arrival today. Will continue to monitor in CCL pending procedure.       Cardiology Summary:  Echocardiogram (date): 4/6/21 st dayna  4/6/21  LVEF 50-55 %   Cardiac CT or MRI (date): 2022 [facility] no CAD  4/28/21  calcium score 95   Cardiac Cath (date): 2021 [facility] no cad

## 2022-05-23 NOTE — ASU PATIENT PROFILE, ADULT - NS PRO PASSIVE SMOKE EXP
name: Farhan Chauhan  Language: Jenifer  Agency: [facility]  Phone number: [phone]  
No

## 2022-05-24 ENCOUNTER — TRANSCRIPTION ENCOUNTER (OUTPATIENT)
Age: 47
End: 2022-05-24

## 2022-05-24 VITALS — HEART RATE: 66 BPM | RESPIRATION RATE: 17 BRPM | SYSTOLIC BLOOD PRESSURE: 153 MMHG | DIASTOLIC BLOOD PRESSURE: 91 MMHG

## 2022-05-24 LAB
ANION GAP SERPL CALC-SCNC: 13 MMOL/L — SIGNIFICANT CHANGE UP (ref 5–17)
BUN SERPL-MCNC: 18.1 MG/DL — SIGNIFICANT CHANGE UP (ref 8–20)
CALCIUM SERPL-MCNC: 8.7 MG/DL — SIGNIFICANT CHANGE UP (ref 8.6–10.2)
CHLORIDE SERPL-SCNC: 100 MMOL/L — SIGNIFICANT CHANGE UP (ref 98–107)
CO2 SERPL-SCNC: 24 MMOL/L — SIGNIFICANT CHANGE UP (ref 22–29)
CREAT SERPL-MCNC: 0.88 MG/DL — SIGNIFICANT CHANGE UP (ref 0.5–1.3)
EGFR: 107 ML/MIN/1.73M2 — SIGNIFICANT CHANGE UP
GLUCOSE BLDC GLUCOMTR-MCNC: 251 MG/DL — HIGH (ref 70–99)
GLUCOSE SERPL-MCNC: 262 MG/DL — HIGH (ref 70–99)
HCT VFR BLD CALC: 36 % — LOW (ref 39–50)
HGB BLD-MCNC: 12.2 G/DL — LOW (ref 13–17)
MAGNESIUM SERPL-MCNC: 2 MG/DL — SIGNIFICANT CHANGE UP (ref 1.6–2.6)
MCHC RBC-ENTMCNC: 29.3 PG — SIGNIFICANT CHANGE UP (ref 27–34)
MCHC RBC-ENTMCNC: 33.9 GM/DL — SIGNIFICANT CHANGE UP (ref 32–36)
MCV RBC AUTO: 86.3 FL — SIGNIFICANT CHANGE UP (ref 80–100)
PLATELET # BLD AUTO: 191 K/UL — SIGNIFICANT CHANGE UP (ref 150–400)
POTASSIUM SERPL-MCNC: 4.4 MMOL/L — SIGNIFICANT CHANGE UP (ref 3.5–5.3)
POTASSIUM SERPL-SCNC: 4.4 MMOL/L — SIGNIFICANT CHANGE UP (ref 3.5–5.3)
RBC # BLD: 4.17 M/UL — LOW (ref 4.2–5.8)
RBC # FLD: 11.9 % — SIGNIFICANT CHANGE UP (ref 10.3–14.5)
SODIUM SERPL-SCNC: 137 MMOL/L — SIGNIFICANT CHANGE UP (ref 135–145)
WBC # BLD: 15.72 K/UL — HIGH (ref 3.8–10.5)
WBC # FLD AUTO: 15.72 K/UL — HIGH (ref 3.8–10.5)

## 2022-05-24 PROCEDURE — 93010 ELECTROCARDIOGRAM REPORT: CPT

## 2022-05-24 RX ORDER — PANTOPRAZOLE SODIUM 20 MG/1
1 TABLET, DELAYED RELEASE ORAL
Qty: 70 | Refills: 0
Start: 2022-05-24 | End: 2022-06-06

## 2022-05-24 RX ORDER — SUCRALFATE 1 G
10 TABLET ORAL
Qty: 280 | Refills: 0
Start: 2022-05-24 | End: 2022-06-06

## 2022-05-24 RX ADMIN — APIXABAN 5 MILLIGRAM(S): 2.5 TABLET, FILM COATED ORAL at 06:04

## 2022-05-24 RX ADMIN — Medication 1 GRAM(S): at 06:04

## 2022-05-24 RX ADMIN — Medication 6: at 07:32

## 2022-05-24 RX ADMIN — PANTOPRAZOLE SODIUM 40 MILLIGRAM(S): 20 TABLET, DELAYED RELEASE ORAL at 06:03

## 2022-05-24 RX ADMIN — LOSARTAN POTASSIUM 50 MILLIGRAM(S): 100 TABLET, FILM COATED ORAL at 06:03

## 2022-05-24 NOTE — PROGRESS NOTE ADULT - SUBJECTIVE AND OBJECTIVE BOX
Pt doing well POD #1 s/p elective radiofrequency atrial fibrillation ablation (WACA/PVI, CTI line). Labs / telemetry reviewed. b/l groin sutures removed at bedside. Denies any complaints this AM.     EKG: Pending  TELE: Sinus rhythm with intact conduction.     MEDICATIONS  (STANDING):  apixaban 5 milliGRAM(s) Oral <User Schedule>  atorvastatin 20 milliGRAM(s) Oral at bedtime  dextrose 5%. 1000 milliLiter(s) (50 mL/Hr) IV Continuous <Continuous>  dextrose 5%. 1000 milliLiter(s) (100 mL/Hr) IV Continuous <Continuous>  dextrose 50% Injectable 25 Gram(s) IV Push once  dextrose 50% Injectable 12.5 Gram(s) IV Push once  dextrose 50% Injectable 25 Gram(s) IV Push once  glucagon  Injectable 1 milliGRAM(s) IntraMuscular once  insulin lispro (ADMELOG) corrective regimen sliding scale   SubCutaneous three times a day before meals  insulin lispro (ADMELOG) corrective regimen sliding scale   SubCutaneous at bedtime  losartan 50 milliGRAM(s) Oral daily  metoprolol succinate ER 50 milliGRAM(s) Oral daily  pantoprazole    Tablet 40 milliGRAM(s) Oral every 12 hours  sucralfate suspension 1 Gram(s) Oral every 12 hours    MEDICATIONS  (PRN):  acetaminophen     Tablet .. 650 milliGRAM(s) Oral every 6 hours PRN Moderate Pain (4 - 6)  ALPRAZolam 0.25 milliGRAM(s) Oral every 6 hours PRN Anxiety / insomnia  aluminum hydroxide/magnesium hydroxide/simethicone Suspension 30 milliLiter(s) Oral every 4 hours PRN Dyspepsia  benzocaine 15 mG/menthol 3.6 mG Lozenge 1 Lozenge Oral every 2 hours PRN Sore Throat  dextrose Oral Gel 15 Gram(s) Oral once PRN Blood Glucose LESS THAN 70 milliGRAM(s)/deciliter  oxyCODONE    IR 5 milliGRAM(s) Oral every 6 hours PRN Severe Pain (7 - 10)      Allergies    No Known Allergies    Intolerances      PAST MEDICAL & SURGICAL HISTORY:  Paroxysmal atrial fibrillation      Sleep apnea      HTN (hypertension)      Obesity      Diabetes mellitus      Fatty liver      Deviated septum      H/O arthroscopy  knee          Vital Signs Last 24 Hrs  T(C): 36.3 (24 May 2022 05:55), Max: 36.8 (23 May 2022 07:44)  T(F): 97.4 (24 May 2022 05:55), Max: 98.2 (23 May 2022 07:44)  HR: 72 (24 May 2022 05:55) (60 - 82)  BP: 150/87 (24 May 2022 05:55) (122/68 - 159/86)  BP(mean): --  RR: 16 (24 May 2022 05:55) (16 - 20)  SpO2: 95% (24 May 2022 05:55) (91% - 98%)    Physical Exam:  Constitutional: NAD, AAOx3  Cardiovascular: +S1S2 RRR  Pulmonary: CTA b/l, unlabored  Abd: soft NTND +BS  Groins: C/D/I bilaterally; no bleeding, hematoma, edema. Minimal ecchymosis b/l groins.  Extremities: no pedal edema, +distal pulses b/l  Neuro: non focal, FUNES x4    LABS:                        12.2   15.72 )-----------( 191      ( 24 May 2022 05:41 )             36.0     05-24    137  |  100  |  18.1  ----------------------------<  262<H>  4.4   |  24.0  |  0.88    Ca    8.7      24 May 2022 05:41  Mg     2.0     05-24            Assessment:   46y Male h/o HTN, DM II, HLD, FIFI (nocturnal CPAP), paroxsymal atrial fibrillation (Eliquis), with symptomatic palpitations associated with dyspnea, now POD #1 s/p post elective radiofrequency atrial fibrillation ablation (WACA/PVI, CTI line) access via b/l femoral veins, hemostasis achieved with figure of 8 sutures b/l. Pt denies any complaints this morning. Leukocytosis (15.72) appreciated on AM CBC. Pt denies fevers, chills, cough, chest pain, abdominal pain, nausea, and vomiting.        Plan:   Resume Eliquis 5mg BID  Start Protonix 40mg BID x 2 weeks then daily X 6 weeks.     Start Carafate 1gm BID x 2 weeks.   Decrease Metoprolol Succinate to 50mg daily.    Pt instructed as activity limitations - no lifting/pushing/pulling >10 lbs or strenuous exercise x 1 week.   Pt instructed as to access site care and f/up - written instructions included in d/c documents.  Outpt f/up in 2-4 weeks - office will contact pt to schedule.     Pt doing well POD #1 s/p elective radiofrequency atrial fibrillation ablation (WACA/PVI, CTI line). Labs / telemetry reviewed. b/l groin sutures removed at bedside. Denies any complaints this AM.     EKG: Normal sinus rhythm with intact conduction  TELE: Sinus rhythm with intact conduction.     MEDICATIONS  (STANDING):  apixaban 5 milliGRAM(s) Oral <User Schedule>  atorvastatin 20 milliGRAM(s) Oral at bedtime  dextrose 5%. 1000 milliLiter(s) (50 mL/Hr) IV Continuous <Continuous>  dextrose 5%. 1000 milliLiter(s) (100 mL/Hr) IV Continuous <Continuous>  dextrose 50% Injectable 25 Gram(s) IV Push once  dextrose 50% Injectable 12.5 Gram(s) IV Push once  dextrose 50% Injectable 25 Gram(s) IV Push once  glucagon  Injectable 1 milliGRAM(s) IntraMuscular once  insulin lispro (ADMELOG) corrective regimen sliding scale   SubCutaneous three times a day before meals  insulin lispro (ADMELOG) corrective regimen sliding scale   SubCutaneous at bedtime  losartan 50 milliGRAM(s) Oral daily  metoprolol succinate ER 50 milliGRAM(s) Oral daily  pantoprazole    Tablet 40 milliGRAM(s) Oral every 12 hours  sucralfate suspension 1 Gram(s) Oral every 12 hours    MEDICATIONS  (PRN):  acetaminophen     Tablet .. 650 milliGRAM(s) Oral every 6 hours PRN Moderate Pain (4 - 6)  ALPRAZolam 0.25 milliGRAM(s) Oral every 6 hours PRN Anxiety / insomnia  aluminum hydroxide/magnesium hydroxide/simethicone Suspension 30 milliLiter(s) Oral every 4 hours PRN Dyspepsia  benzocaine 15 mG/menthol 3.6 mG Lozenge 1 Lozenge Oral every 2 hours PRN Sore Throat  dextrose Oral Gel 15 Gram(s) Oral once PRN Blood Glucose LESS THAN 70 milliGRAM(s)/deciliter  oxyCODONE    IR 5 milliGRAM(s) Oral every 6 hours PRN Severe Pain (7 - 10)      Allergies    No Known Allergies    Intolerances      PAST MEDICAL & SURGICAL HISTORY:  Paroxysmal atrial fibrillation      Sleep apnea      HTN (hypertension)      Obesity      Diabetes mellitus      Fatty liver      Deviated septum      H/O arthroscopy  knee          Vital Signs Last 24 Hrs  T(C): 36.3 (24 May 2022 05:55), Max: 36.8 (23 May 2022 07:44)  T(F): 97.4 (24 May 2022 05:55), Max: 98.2 (23 May 2022 07:44)  HR: 72 (24 May 2022 05:55) (60 - 82)  BP: 150/87 (24 May 2022 05:55) (122/68 - 159/86)  BP(mean): --  RR: 16 (24 May 2022 05:55) (16 - 20)  SpO2: 95% (24 May 2022 05:55) (91% - 98%)    Physical Exam:  Constitutional: NAD, AAOx3  Cardiovascular: +S1S2 RRR  Pulmonary: CTA b/l, unlabored  Abd: soft NTND +BS  Groins: C/D/I bilaterally; no bleeding, hematoma, edema. Minimal ecchymosis b/l groins.  Extremities: no pedal edema, +distal pulses b/l  Neuro: non focal, FUNES x4    LABS:                        12.2   15.72 )-----------( 191      ( 24 May 2022 05:41 )             36.0     05-24    137  |  100  |  18.1  ----------------------------<  262<H>  4.4   |  24.0  |  0.88    Ca    8.7      24 May 2022 05:41  Mg     2.0     05-24            Assessment:   46y Male h/o HTN, DM II, HLD, FIFI (nocturnal CPAP), paroxsymal atrial fibrillation (Eliquis), with symptomatic palpitations associated with dyspnea, now POD #1 s/p post elective radiofrequency atrial fibrillation ablation (WACA/PVI, CTI line) access via b/l femoral veins, hemostasis achieved with figure of 8 sutures b/l. Pt denies any complaints this morning. Leukocytosis (15.72) appreciated on AM CBC. Pt denies fevers, chills, cough, chest pain, abdominal pain, nausea, and vomiting.        Plan:   Resume Eliquis 5mg BID  Start Protonix 40mg BID x 2 weeks then daily X 6 weeks.     Start Carafate 1gm BID x 2 weeks.   Decrease Metoprolol Succinate to 50mg daily.    Pt instructed as activity limitations - no lifting/pushing/pulling >10 lbs or strenuous exercise x 1 week.   Pt instructed as to access site care and f/up - written instructions included in d/c documents.  Outpt f/up in 2-4 weeks - office will contact pt to schedule.

## 2022-05-24 NOTE — DISCHARGE NOTE NURSING/CASE MANAGEMENT/SOCIAL WORK - NSDCPEFALRISK_GEN_ALL_CORE
For information on Fall & Injury Prevention, visit: [email or web address] OR  [email or web address] OR  https://www.cdc.gov/steadi/patient.html

## 2022-05-24 NOTE — DISCHARGE NOTE NURSING/CASE MANAGEMENT/SOCIAL WORK - PATIENT PORTAL LINK FT
You can access the FollowMyHealth Patient Portal offered by [facility] by registering at the following website: [email or web address]. By joining [email or web address] FollowMyHealth portal, you will also be able to view your health information using other applications (apps) compatible with our system.

## 2022-05-30 PROCEDURE — C1894: CPT

## 2022-05-30 PROCEDURE — 82962 GLUCOSE BLOOD TEST: CPT

## 2022-05-30 PROCEDURE — C1889: CPT

## 2022-05-30 PROCEDURE — C1766: CPT

## 2022-05-30 PROCEDURE — C1732: CPT

## 2022-05-30 PROCEDURE — 85027 COMPLETE CBC AUTOMATED: CPT

## 2022-05-30 PROCEDURE — C1731: CPT

## 2022-05-30 PROCEDURE — C1759: CPT

## 2022-05-30 PROCEDURE — 93657 TX L/R ATRIAL FIB ADDL: CPT

## 2022-05-30 PROCEDURE — 36415 COLL VENOUS BLD VENIPUNCTURE: CPT

## 2022-05-30 PROCEDURE — 80048 BASIC METABOLIC PNL TOTAL CA: CPT

## 2022-05-30 PROCEDURE — 93005 ELECTROCARDIOGRAM TRACING: CPT

## 2022-05-30 PROCEDURE — 93623 PRGRMD STIMJ&PACG IV RX NFS: CPT

## 2022-05-30 PROCEDURE — 83735 ASSAY OF MAGNESIUM: CPT

## 2022-05-30 PROCEDURE — 93656 COMPRE EP EVAL ABLTJ ATR FIB: CPT

## 2022-07-26 PROBLEM — I10 ESSENTIAL (PRIMARY) HYPERTENSION: Chronic | Status: ACTIVE | Noted: 2022-05-16

## 2022-07-26 PROBLEM — I48.0 PAROXYSMAL ATRIAL FIBRILLATION: Chronic | Status: ACTIVE | Noted: 2022-05-16

## 2022-07-26 PROBLEM — G47.30 SLEEP APNEA, UNSPECIFIED: Chronic | Status: ACTIVE | Noted: 2022-05-16

## 2022-07-26 PROBLEM — E11.9 TYPE 2 DIABETES MELLITUS WITHOUT COMPLICATIONS: Chronic | Status: ACTIVE | Noted: 2022-05-16

## 2022-07-26 PROBLEM — K76.0 FATTY (CHANGE OF) LIVER, NOT ELSEWHERE CLASSIFIED: Chronic | Status: ACTIVE | Noted: 2022-05-16

## 2022-07-26 PROBLEM — E66.9 OBESITY, UNSPECIFIED: Chronic | Status: ACTIVE | Noted: 2022-05-16

## 2022-09-16 NOTE — DISCUSSION/SUMMARY
[FreeTextEntry1] : 46 year old gentleman with history of HTN, DM, obesity, FIFI on CPAP, presenting for evaluation of paroxysmal atrial fibrillation. HE has had progressively frequent paroxysmal AF, which has been moderately bothersome, and recurrent despite treatment with beta blockade.  We did discuss AF in detail, associated risks and morbidities, and management options, including medical therapy, antiarrhythmic meds, and catheter ablation. Given his symptoms, young age, and desire to avoid further long-term medications, I do believe AF ablation is a very good option for him. We did discuss the option for AF ablation, and we reviewed procedure related risks and benefits, outcomes, and potential to reduce the risk of further AF progression and associated risk of CHF. He expressed understanding, and does want to consider this further as part of a shared decision making process involving Dr. Sow. He will let us know if/when he is ready to schedule this procedure. For now will continue beta blockade. Given his CHADSVASc score of 2, he will remain on anticoagulation for now. We did discuss that he should remain on anticoagulation in the near term, but that the need for long-term anticoagulation could be reconsidered following ablation.  \par \par -Rx given for Toprol XL 50mg qd to reduce need for BID meds. He can take an additional metoprolol tartrate 50mg prn for rapid palpitation \par \par -continue Eliquis \par \par -will schedule AF ablation when pt ready. Likely VERA pre ablation. Hold Eliquis day of the procedure only.  \par \par

## 2022-09-16 NOTE — PHYSICAL EXAM
[Well Developed] : well developed [Well Nourished] : well nourished [No Acute Distress] : no acute distress [Obese] : obese [Normal Conjunctiva] : normal conjunctiva [Normal Venous Pressure] : normal venous pressure [No Carotid Bruit] : no carotid bruit [Normal S1, S2] : normal S1, S2 [No Murmur] : no murmur [Clear Lung Fields] : clear lung fields [Good Air Entry] : good air entry [No Respiratory Distress] : no respiratory distress  [Soft] : abdomen soft [Non Tender] : non-tender [No Masses/organomegaly] : no masses/organomegaly [Normal Bowel Sounds] : normal bowel sounds [Normal Gait] : normal gait [No Edema] : no edema [No Cyanosis] : no cyanosis [No Clubbing] : no clubbing [No Varicosities] : no varicosities [No Rash] : no rash [No Skin Lesions] : no skin lesions [Moves all extremities] : moves all extremities [No Focal Deficits] : no focal deficits [Normal Speech] : normal speech [Alert and Oriented] : alert and oriented [Normal memory] : normal memory [de-identified] : tall and large stature [de-identified] : distant heart sounds

## 2022-09-16 NOTE — REVIEW OF SYSTEMS
[Chest Discomfort] : chest discomfort [Palpitations] : palpitations [Negative] : Heme/Lymph [SOB] : no shortness of breath [Dyspnea on exertion] : not dyspnea during exertion [Leg Claudication] : no intermittent leg claudication [Syncope] : no syncope [Dizziness] : no dizziness [Convulsions] : no convulsions

## 2022-09-16 NOTE — CARDIOLOGY SUMMARY
[de-identified] : 7/8/21 sinus rhythm 63 bpm, narrow QRS, LA enlargement [de-identified] : TTE ([place]) 4/6/21 LVEF 50-55%, LV mildly dilated, trace MR, nml atrial size (3.9cm) [de-identified] : Cardiac CT 4/28/21- non obstructive CAD, LVEF 54$, moderate coronary calfication (Ca score 95)

## 2022-09-22 ENCOUNTER — APPOINTMENT (OUTPATIENT)
Dept: ELECTROPHYSIOLOGY | Facility: CLINIC | Age: 47
End: 2022-09-22

## 2022-10-27 ENCOUNTER — APPOINTMENT (OUTPATIENT)
Dept: ELECTROPHYSIOLOGY | Facility: CLINIC | Age: 47
End: 2022-10-27

## 2022-10-27 VITALS
DIASTOLIC BLOOD PRESSURE: 72 MMHG | OXYGEN SATURATION: 96 % | HEART RATE: 84 BPM | BODY MASS INDEX: 36.45 KG/M2 | SYSTOLIC BLOOD PRESSURE: 118 MMHG | TEMPERATURE: 99.3 F | WEIGHT: 315 LBS | HEIGHT: 78 IN

## 2022-10-27 DIAGNOSIS — Z98.890 OTHER SPECIFIED POSTPROCEDURAL STATES: ICD-10-CM

## 2022-10-27 DIAGNOSIS — R00.2 PALPITATIONS: ICD-10-CM

## 2022-10-27 DIAGNOSIS — Z86.79 OTHER SPECIFIED POSTPROCEDURAL STATES: ICD-10-CM

## 2022-10-27 PROCEDURE — 93000 ELECTROCARDIOGRAM COMPLETE: CPT

## 2022-10-27 PROCEDURE — 99214 OFFICE O/P EST MOD 30 MIN: CPT | Mod: 25

## 2022-10-27 RX ORDER — METOPROLOL SUCCINATE 50 MG/1
50 TABLET, EXTENDED RELEASE ORAL
Qty: 30 | Refills: 2 | Status: DISCONTINUED | COMMUNITY
Start: 2021-07-08 | End: 2022-10-27

## 2022-10-27 RX ORDER — METOPROLOL TARTRATE 50 MG/1
50 TABLET, FILM COATED ORAL DAILY
Refills: 0 | Status: DISCONTINUED | COMMUNITY
End: 2022-10-27

## 2022-10-27 RX ORDER — PANTOPRAZOLE 40 MG/1
40 TABLET, DELAYED RELEASE ORAL
Qty: 90 | Refills: 0 | Status: DISCONTINUED | COMMUNITY
Start: 2022-05-24 | End: 2022-10-27

## 2022-10-27 RX ORDER — EMPAGLIFLOZIN, METFORMIN HYDROCHLORIDE 12.5; 1 MG/1; MG/1
12.5-1 TABLET, EXTENDED RELEASE ORAL DAILY
Refills: 0 | Status: DISCONTINUED | COMMUNITY
End: 2022-10-27

## 2022-10-27 NOTE — PHYSICAL EXAM
[Well Developed] : well developed [Normal Gait] : normal gait [Moves all extremities] : moves all extremities [Alert and Oriented] : alert and oriented

## 2022-10-28 NOTE — ADDENDUM
[FreeTextEntry1] : He has done very well since AF ablation, and reports no symptomatic recurrence. Tolerating anticoagulation, but he is hopeful to avoid long-term medical therapy. \par Will stop beta blocker at this time. Will need close BP followup. would restart BB if symptoms recur. \par Will plan further surveillance monitoring prior to stopping OAC. He has a CHADSVASc =2, and reasonable to consider cessation of OAC if absence of recurrent AF is confirmed, along with risk factor control. \par will get MCOT, and consider ILR in future for ongoing surveillance.

## 2022-10-28 NOTE — HISTORY OF PRESENT ILLNESS
[FreeTextEntry1] : 47 year old gentleman with history of HTN, DM (Hgb A1c 8.1%), obesity, FIFI on CPAP, presenting for follow up s/p ablation for symptomatic paroxysmal atrial fibrillation. \par \par To summarize, he has had paroxysmal AF for the last 3-4 yrs, and has been on treatment with metoprolol and anticoagulation with Eliquis. With AF he has symptoms of palpitation, anxiety, mild dyspnea and chest heaviness. He denies syncope or near syncope. Recently the episodes have occurred with increasing frequency, now about once per week, with symptoms lasting about 1 hour. He had a cardiac CT which revealed no significant CAD. \par \par He is now s/p ablation for PAF ( PVI, CTI line) on 5/23/22.\par \par Today, he presents for follow up and reports he has not experienced any symptomatic AF reoccurrence post procedure. ECG reveals SR 82 bpm. Maintained on Eliquis. Denies easy bruising, bleeding, or falls. Expresses desire to limit long term medical therapy.

## 2022-10-28 NOTE — REVIEW OF SYSTEMS
[Feeling Fatigued] : not feeling fatigued [SOB] : no shortness of breath [Dyspnea on exertion] : not dyspnea during exertion [Chest Discomfort] : no chest discomfort [Palpitations] : no palpitations [Syncope] : no syncope [Dizziness] : no dizziness

## 2022-10-28 NOTE — DISCUSSION/SUMMARY
[EKG obtained to assist in diagnosis and management of assessed problem(s)] : EKG obtained to assist in diagnosis and management of assessed problem(s) [FreeTextEntry1] : 47 year old gentleman with history of HTN, DM (Hgb A1c 8.1%), obesity, FIFI on CPAP, presenting for follow up s/p ablation for symptomatic paroxysmal atrial fibrillation. \par \par To summarize, he has had paroxysmal AF for the last 3-4 yrs, and has been on treatment with metoprolol and anticoagulation with Eliquis. With AF he has symptoms of palpitation, anxiety, mild dyspnea and chest heaviness. He denies syncope or near syncope. Recently the episodes have occurred with increasing frequency, now about once per week, with symptoms lasting about 1 hour. He had a cardiac CT which revealed no significant CAD. \par \par He is now s/p ablation for PAF ( PVI, CTI line) on 5/23/22.\par \par Today, he presents for follow up and reports he has not experienced any symptomatic AF reoccurrence post procedure. ECG reveals SR 82 bpm. Maintained on Eliquis. Denies easy bruising, bleeding, or falls. Expresses desire to limit long term medical therapy. \par \par Recommendation: \par \par Mr. Khan is doing very well s/p AF ablation 5/23/22. Denies any symptomatic arrhythmia recurrence. Wishes to avoid long term medical therapy if possible. May discontinue metoprolol at this time. Counseled on the need for ongoing BP monitoring. Has f/u pending with Dr. Sow in a few weeks. In regards to AC management he is [id number] 2. Discussed risks/benefits of long term AC. As initial strategy will place 1 week MCOT and then he will f/u in office to consider further. Reviewed ILR implant as a means for ongoing arrhythmia surveillance. \par -EP follow up in 3 months or sooner PRN.\par \par Bebe Martinez ANP-C

## 2022-12-07 ENCOUNTER — OFFICE (OUTPATIENT)
Dept: URBAN - METROPOLITAN AREA CLINIC 6 | Facility: CLINIC | Age: 47
Setting detail: OPHTHALMOLOGY
End: 2022-12-07
Payer: COMMERCIAL

## 2022-12-07 DIAGNOSIS — H25.13: ICD-10-CM

## 2022-12-07 DIAGNOSIS — E11.9: ICD-10-CM

## 2022-12-07 PROBLEM — H52.7 REFRACTIVE ERROR: Status: ACTIVE | Noted: 2022-12-07

## 2022-12-07 PROCEDURE — 92004 COMPRE OPH EXAM NEW PT 1/>: CPT | Performed by: OPHTHALMOLOGY

## 2022-12-07 ASSESSMENT — SPHEQUIV_DERIVED
OS_SPHEQUIV: -0.75
OS_SPHEQUIV: -0.75

## 2022-12-07 ASSESSMENT — REFRACTION_MANIFEST
OS_SPHERE: -0.50
OD_CYLINDER: -0.75
OD_AXIS: 045
OS_AXIS: 130
OD_SPHERE: PLANO
OS_CYLINDER: -0.50
OD_VA1: 20/20
OS_VA1: 20/20

## 2022-12-07 ASSESSMENT — KERATOMETRY
OD_K2POWER_DIOPTERS: 42.50
OS_K1POWER_DIOPTERS: 40.75
OS_AXISANGLE_DEGREES: 066
OS_K2POWER_DIOPTERS: 42.25
OD_K1POWER_DIOPTERS: 40.75
OD_AXISANGLE_DEGREES: 132

## 2022-12-07 ASSESSMENT — REFRACTION_AUTOREFRACTION
OS_SPHERE: -0.50
OS_CYLINDER: -0.50
OD_CYLINDER: -0.75
OS_AXIS: 130
OD_SPHERE: PLANO
OD_AXIS: 045

## 2022-12-07 ASSESSMENT — AXIALLENGTH_DERIVED
OS_AL: 24.6654
OS_AL: 24.6654

## 2022-12-07 ASSESSMENT — CONFRONTATIONAL VISUAL FIELD TEST (CVF)
OS_FINDINGS: FULL
OD_FINDINGS: FULL

## 2022-12-07 ASSESSMENT — TONOMETRY
OS_IOP_MMHG: 11
OD_IOP_MMHG: 13

## 2022-12-07 ASSESSMENT — VISUAL ACUITY
OD_BCVA: 20/20-2
OS_BCVA: 20/20-1

## 2023-01-19 ENCOUNTER — APPOINTMENT (OUTPATIENT)
Dept: ELECTROPHYSIOLOGY | Facility: CLINIC | Age: 48
End: 2023-01-19

## 2023-12-09 ENCOUNTER — NON-APPOINTMENT (OUTPATIENT)
Age: 48
End: 2023-12-09

## 2024-02-02 ENCOUNTER — APPOINTMENT (OUTPATIENT)
Dept: PULMONOLOGY | Facility: CLINIC | Age: 49
End: 2024-02-02
Payer: COMMERCIAL

## 2024-02-02 VITALS
WEIGHT: 315 LBS | DIASTOLIC BLOOD PRESSURE: 72 MMHG | BODY MASS INDEX: 36.45 KG/M2 | RESPIRATION RATE: 16 BRPM | HEART RATE: 74 BPM | SYSTOLIC BLOOD PRESSURE: 138 MMHG | HEIGHT: 78 IN | OXYGEN SATURATION: 96 %

## 2024-02-02 DIAGNOSIS — G47.33 OBSTRUCTIVE SLEEP APNEA (ADULT) (PEDIATRIC): ICD-10-CM

## 2024-02-02 DIAGNOSIS — I48.91 UNSPECIFIED ATRIAL FIBRILLATION: ICD-10-CM

## 2024-02-02 DIAGNOSIS — E66.9 OBESITY, UNSPECIFIED: ICD-10-CM

## 2024-02-02 PROCEDURE — 99214 OFFICE O/P EST MOD 30 MIN: CPT

## 2024-02-02 RX ORDER — SEMAGLUTIDE 0.68 MG/ML
INJECTION, SOLUTION SUBCUTANEOUS
Refills: 0 | Status: ACTIVE | COMMUNITY

## 2024-02-02 RX ORDER — INSULIN DEGLUDEC INJECTION 200 U/ML
INJECTION, SOLUTION SUBCUTANEOUS
Refills: 0 | Status: ACTIVE | COMMUNITY

## 2024-02-02 RX ORDER — BUPROPION HYDROCHLORIDE 75 MG/1
TABLET, FILM COATED ORAL
Refills: 0 | Status: ACTIVE | COMMUNITY

## 2024-02-02 NOTE — CONSULT LETTER
[Dear  ___] : Dear  [unfilled], [Consult Letter:] : I had the pleasure of evaluating your patient, [unfilled]. [Consult Closing:] : Thank you very much for allowing me to participate in the care of this patient.  If you have any questions, please do not hesitate to contact me. [DrAsad  ___] : Dr. NARAYANAN

## 2024-02-02 NOTE — REVIEW OF SYSTEMS
[Obesity] : obesity [Hypersomnolence] : sleeping much more than usual [Negative] : Psychiatric [Difficulty Initiating Sleep] : no difficulty falling asleep [Lower Extremity Discomfort] : no lower extremity discomfort [Unusual Sleep Behavior] : no unusual sleep behavior [FreeTextEntry3] : per HPI [FreeTextEntry8] : per HPI [FreeTextEntry9] : per HPI

## 2024-02-02 NOTE — PLAN
[TextEntry] : Continue CPAP. Will order new supplies. Gave sample of AirFit N30i to try. Weight loss. Cardio f/u.

## 2024-02-02 NOTE — PHYSICAL EXAM
[General Appearance - In No Acute Distress] : no acute distress [Low Lying Soft Palate] : low lying soft palate [Elongated Uvula] : elongated uvula [Enlarged Base of the Tongue] : enlargement of the base of the tongue [IV] : IV [Heart Rate And Rhythm] : heart rate was normal and rhythm regular [Heart Sounds] : normal S1 and S2 [Respiration, Rhythm And Depth] : normal respiratory rhythm and effort [Exaggerated Use Of Accessory Muscles For Inspiration] : no accessory muscle use [Auscultation Breath Sounds / Voice Sounds] : lungs were clear to auscultation bilaterally [Abnormal Walk] : normal gait [Nail Clubbing] : no clubbing of the fingernails [Skin Color & Pigmentation] : normal skin color and pigmentation [Oriented To Time, Place, And Person] : oriented to person, place, and time [Impaired Insight] : insight and judgment were intact [Affect] : the affect was normal [] : the neck was supple [Normal Oropharynx] : abnormal oropharynx

## 2024-02-02 NOTE — ASSESSMENT
[FreeTextEntry1] : Severe FIFI. The patient is using CPAP well, is compliant with it's use and getting clinical benefit. The patient should continue to use CPAP. He changed the pressure from APAP to CPAP mode but it is working very well and no change needed.  Obesity. Afib, HTN as well.

## 2024-02-02 NOTE — HISTORY OF PRESENT ILLNESS
[AHI: ___ per hour] : Apnea-hypopnea index:  [unfilled] per hour [Date: ___] : the most recent therapeutic polysomnogram was completed [unfilled] [CPAP: ___ cmH2O] : CPAP: [unfilled] cmH2O [% Days used > 4 hrs: ____] : Days used > 4 hrs: [unfilled] % [Therapy based AHI: ___ /hr] : Therapy based AHI: [unfilled] / hr [FreeTextEntry1] : Hx HTN, Afib, DM, obesity.  hx FIFI. Dx about 2011. Given cpap. Had been using it since. [place] so did not come back here for a while.   Last visit here 4/2021. At that time did a new split night PSG which showed severe FIFI; ordered for APAP 13-17.  He changed the machine pressure on his own to 16.   Doing very well. Uses it every night. Will not sleep w/o it. Compliance excellent. Therapeutic AHI WNL.   Using a nasal mask. DME is [facility].    S/P hosp at [facility] for Afib with RVR; had it a few years prior. Episodes becoming more frequent.   Cardiologist is Dr Beard in [place].

## 2024-10-22 ENCOUNTER — OFFICE (OUTPATIENT)
Dept: URBAN - METROPOLITAN AREA CLINIC 6 | Facility: CLINIC | Age: 49
Setting detail: OPHTHALMOLOGY
End: 2024-10-22
Payer: COMMERCIAL

## 2024-10-22 DIAGNOSIS — E11.9: ICD-10-CM

## 2024-10-22 DIAGNOSIS — H25.13: ICD-10-CM

## 2024-10-22 PROCEDURE — 92014 COMPRE OPH EXAM EST PT 1/>: CPT | Performed by: OPHTHALMOLOGY

## 2024-10-22 ASSESSMENT — TONOMETRY
OS_IOP_MMHG: 10
OD_IOP_MMHG: 11

## 2024-10-22 ASSESSMENT — REFRACTION_AUTOREFRACTION
OD_AXIS: 034
OD_SPHERE: -0.50
OD_CYLINDER: -0.75
OS_AXIS: 109
OS_SPHERE: -0.50
OS_CYLINDER: -0.50

## 2024-10-22 ASSESSMENT — REFRACTION_MANIFEST
OD_AXIS: 035
OS_CYLINDER: -0.50
OD_CYLINDER: -0.75
OU_VA: 20/20-1
OD_VA1: 20/25
OD_SPHERE: -0.50
OS_AXIS: 110
OS_SPHERE: -0.50
OS_VA1: 20/20

## 2024-10-22 ASSESSMENT — KERATOMETRY
OD_K1POWER_DIOPTERS: 40.75
OS_AXISANGLE_DEGREES: 083
OD_K2POWER_DIOPTERS: 42.50
OD_AXISANGLE_DEGREES: 122
OS_K1POWER_DIOPTERS: 41.25
OS_K2POWER_DIOPTERS: 42.25

## 2024-10-22 ASSESSMENT — VISUAL ACUITY
OS_BCVA: 20/25
OD_BCVA: 20/25-1

## 2024-10-22 ASSESSMENT — CONFRONTATIONAL VISUAL FIELD TEST (CVF)
OD_FINDINGS: FULL
OS_FINDINGS: FULL

## 2025-05-15 ENCOUNTER — NON-APPOINTMENT (OUTPATIENT)
Age: 50
End: 2025-05-15